# Patient Record
Sex: MALE | Race: WHITE | NOT HISPANIC OR LATINO | Employment: OTHER | URBAN - METROPOLITAN AREA
[De-identification: names, ages, dates, MRNs, and addresses within clinical notes are randomized per-mention and may not be internally consistent; named-entity substitution may affect disease eponyms.]

---

## 2022-06-13 RX ORDER — PIOGLITAZONEHYDROCHLORIDE 30 MG/1
30 TABLET ORAL DAILY
COMMUNITY

## 2022-06-13 RX ORDER — TEMAZEPAM 30 MG/1
30 CAPSULE ORAL NIGHTLY PRN
COMMUNITY
Start: 2022-03-22 | End: 2023-10-23

## 2022-06-13 RX ORDER — FLUOCINONIDE 0.5 MG/G
CREAM TOPICAL
COMMUNITY
Start: 2022-06-03

## 2022-06-13 RX ORDER — ALBUTEROL SULFATE 90 UG/1
2 AEROSOL, METERED RESPIRATORY (INHALATION) EVERY 4 HOURS PRN
COMMUNITY
Start: 2022-05-09

## 2022-06-13 RX ORDER — DULOXETIN HYDROCHLORIDE 30 MG/1
30 CAPSULE, DELAYED RELEASE ORAL DAILY
COMMUNITY
Start: 2022-03-22

## 2022-06-13 RX ORDER — EMTRICITABINE AND TENOFOVIR DISOPROXIL FUMARATE 200; 300 MG/1; MG/1
1 TABLET, FILM COATED ORAL
COMMUNITY
Start: 2022-06-03 | End: 2023-10-23

## 2022-06-13 RX ORDER — RITONAVIR 100 MG/1
100 TABLET ORAL EVERY MORNING
COMMUNITY
Start: 2022-06-03 | End: 2023-01-26

## 2022-06-13 RX ORDER — DAPAGLIFLOZIN 10 MG/1
10 TABLET, FILM COATED ORAL DAILY
COMMUNITY
End: 2023-01-26 | Stop reason: ALTCHOICE

## 2022-06-13 RX ORDER — TAMSULOSIN HYDROCHLORIDE 0.4 MG/1
0.4 CAPSULE ORAL 2 TIMES DAILY
COMMUNITY

## 2022-06-13 RX ORDER — PRAVASTATIN SODIUM 80 MG/1
80 TABLET ORAL DAILY
COMMUNITY
End: 2023-10-23

## 2022-06-13 RX ORDER — ATAZANAVIR 300 MG/1
300 CAPSULE ORAL DAILY
COMMUNITY
Start: 2022-06-03 | End: 2023-10-23

## 2022-06-15 ENCOUNTER — OFFICE VISIT (OUTPATIENT)
Dept: NEPHROLOGY | Facility: CLINIC | Age: 67
End: 2022-06-15
Payer: COMMERCIAL

## 2022-06-15 VITALS
SYSTOLIC BLOOD PRESSURE: 107 MMHG | BODY MASS INDEX: 23.76 KG/M2 | HEART RATE: 92 BPM | HEIGHT: 66 IN | DIASTOLIC BLOOD PRESSURE: 63 MMHG | OXYGEN SATURATION: 97 % | WEIGHT: 147.81 LBS | TEMPERATURE: 98 F

## 2022-06-15 DIAGNOSIS — N18.32 STAGE 3B CHRONIC KIDNEY DISEASE: Primary | ICD-10-CM

## 2022-06-15 PROCEDURE — 99213 OFFICE O/P EST LOW 20 MIN: CPT | Mod: S$GLB,,, | Performed by: INTERNAL MEDICINE

## 2022-06-15 PROCEDURE — 99213 PR OFFICE/OUTPT VISIT, EST, LEVL III, 20-29 MIN: ICD-10-PCS | Mod: S$GLB,,, | Performed by: INTERNAL MEDICINE

## 2022-06-15 RX ORDER — INSULIN LISPRO 100 [IU]/ML
10 INJECTION, SOLUTION INTRAVENOUS; SUBCUTANEOUS
COMMUNITY
Start: 2022-05-23

## 2022-06-15 NOTE — PROGRESS NOTES
"Nephrology Progress Note      Patient Name:  Dandre Hopkins III    :  1955    Medical Record:  [id number]    Interval History:  Seeing for ckd.    Subjective:  Patient seen and examined. No complaints.      Current Medications: Current medications reviewed.    Review of Systems  Review of Systems   Respiratory: Negative for cough, hemoptysis, sputum production and shortness of breath.    Cardiovascular: Negative for chest pain, palpitations, orthopnea, claudication and leg swelling.   Gastrointestinal: Negative for abdominal pain, blood in stool, constipation, diarrhea, heartburn, nausea and vomiting.   Genitourinary: Negative for dysuria, frequency, hematuria and urgency.       Objective:      Physical Exam:   Vital Signs:  /63 (BP Location: Left arm, Patient Position: Sitting, BP Method: Medium (Automatic))   Pulse 92   Temp 98.4 °F (36.9 °C) (Oral)   Ht 5' 6" (1.676 m)   Wt 67 kg (147 lb 12.8 oz)   SpO2 97%   BMI 23.86 kg/m²   Constitutional:  WNL  HENT:  Normocephalic, Atraumatic, Pupils Reactive, Normal oropharynx, Nose normal.   Cardiovascular:  Normal heart rate, Normal rhythm, No murmurs, No rubs, No gallops.   Respiratory:  Normal breath sounds, No respiratory distress, No wheezing, No chest tenderness.   GI:  Bowel sounds normal, Soft, No tenderness, No masses, No pulsatile masses.  : No costovertebral angle tenderness    Extremities:  Intact distal pulses, No edema, No tenderness, No cyanosis, No clubbing.   Neurologic:  Alert & oriented x 3, Normal motor function, Normal sensory function, No focal deficits noted.  Skin:  Warm and dry      Labs:  No results for input(s): K, CO2, BUN, ALBUMIN, CALCIUM, GLU, HGB, WBC, PLT, INR in the last 72 hours.    Invalid input(s): CRE  .    Radiology:   None.      Assessment:  ckd3b    Cr is slightly better.    Plan:  RTC in 6 months.      Electronically signed by: Monisha Ram, 6/15/2022 2:10 PM.    "

## 2023-01-26 ENCOUNTER — OFFICE VISIT (OUTPATIENT)
Dept: NEPHROLOGY | Facility: CLINIC | Age: 68
End: 2023-01-26
Payer: COMMERCIAL

## 2023-01-26 VITALS
DIASTOLIC BLOOD PRESSURE: 62 MMHG | HEART RATE: 93 BPM | OXYGEN SATURATION: 96 % | SYSTOLIC BLOOD PRESSURE: 112 MMHG | HEIGHT: 66 IN | WEIGHT: 147 LBS | BODY MASS INDEX: 23.63 KG/M2

## 2023-01-26 DIAGNOSIS — I10 PRIMARY HYPERTENSION: ICD-10-CM

## 2023-01-26 DIAGNOSIS — N18.32 TYPE 2 DIABETES MELLITUS WITH STAGE 3B CHRONIC KIDNEY DISEASE, WITHOUT LONG-TERM CURRENT USE OF INSULIN: ICD-10-CM

## 2023-01-26 DIAGNOSIS — N18.32 STAGE 3B CHRONIC KIDNEY DISEASE: Primary | ICD-10-CM

## 2023-01-26 DIAGNOSIS — E11.22 TYPE 2 DIABETES MELLITUS WITH STAGE 3B CHRONIC KIDNEY DISEASE, WITHOUT LONG-TERM CURRENT USE OF INSULIN: ICD-10-CM

## 2023-01-26 PROCEDURE — 99213 OFFICE O/P EST LOW 20 MIN: CPT | Mod: S$GLB,,, | Performed by: INTERNAL MEDICINE

## 2023-01-26 PROCEDURE — 99213 PR OFFICE/OUTPT VISIT, EST, LEVL III, 20-29 MIN: ICD-10-PCS | Mod: S$GLB,,, | Performed by: INTERNAL MEDICINE

## 2023-01-26 RX ORDER — EMPAGLIFLOZIN 10 MG/1
10 TABLET, FILM COATED ORAL EVERY MORNING
COMMUNITY
Start: 2022-12-18

## 2023-01-26 RX ORDER — ATORVASTATIN CALCIUM 20 MG/1
20 TABLET, FILM COATED ORAL DAILY
COMMUNITY
Start: 2023-01-02

## 2023-01-26 NOTE — PROGRESS NOTES
Pt Name:  Dandre Hopkins III  Pt :  1955  Pt MRN:  [id number]    Date: 2023  Provider: Monisha Ram    Reason for visit:   Seeing for ckd.    Chief Complaint:   Chief Complaint   Patient presents with    Chronic Kidney Disease     Serum creatinine 1.81, GFR 41, CKD stage 3       HPI:  [unfilled]  No complaints.    History:   Past Medical History:   Diagnosis Date    Bursitis     CKD (chronic kidney disease)     Clotting disorder     Colon polyp     DVT (deep venous thrombosis)     Dyslipidemia     Human immunodeficiency virus (HIV) disease     Insomnia     Neuropathy     Type 2 diabetes mellitus      Past Surgical History:   Procedure Laterality Date    COLONOSCOPY      TONSILLECTOMY      TRACHEOTOMY       Family History   Problem Relation Age of Onset    Cancer Mother         lungs    Emphysema Mother     Diabetes Father     Alzheimer's disease Father     Cancer Sister         lung    Heart disease Brother      Social History     Substance and Sexual Activity   Alcohol Use Never     Social History     Substance and Sexual Activity   Drug Use Never     Social History     Substance and Sexual Activity   Sexual Activity Not on file    Comment: pt declines     has no history on file for sexual activity.  Social History     Tobacco Use   Smoking Status Former    Packs/day: 0.20    Types: Cigarettes    Quit date: 2022    Years since quittin.1   Smokeless Tobacco Never       Allergies:  Review of patient's allergies indicates:  No Known Allergies    [unfilled]    ROS:   Constitutional:  Denies fever or chills   Eyes:  Denies change in visual acuity   HENT:  Denies nasal congestion or sore throat   Respiratory:  As in the history of the present illness.   Cardiovascular:  As in the history of the present illness.   GI:  As in the history of the present illness.    Musculoskeletal:  Denies back pain or joint pain   Integument:  Denies rash   Neurologic:  Denies headache, focal weakness or sensory changes  "  Endocrine:  Denies polyuria or polydipsia   Lymphatic:  Denies swollen glands   Psychiatric:  Denies depression or anxiety    Physical Exam:   Vitals:   Vitals:    01/26/23 1042   BP: 112/62   Pulse: 93   SpO2: 96%   Weight: 66.7 kg (147 lb)   Height: 5' 6" (1.676 m)     Body mass index is 23.73 kg/m².    Constitutional:  Well developed, well nourished, and in no acute distress   Eyes:  PERRLA, conjunctiva normal   HENT:  Atraumatic, external ears normal, nose normal.  Neck: There is no jugular venous distension or thyromegaly.   Respiratory:  No respiratory distress, normal breath sounds, no rales, no wheezing   Cardiovascular:  Normal rate, and a regular rhythm, no murmurs, no gallops, no rub, and no edema.  GI:  Normal bowel sounds.  Musculoskeletal:  No deformities.   Neurologic:  Alert & oriented x 3, CN 2-12 normal, normal motor function, and no asterixis.   Psychiatric:  Speech and behavior appropriate.    Labs/Tests:  Lab Results   Component Value Date     05/26/2022    K 4.3 05/26/2022     05/26/2022    CO2 29 05/26/2022    BUN 20 05/26/2022    CREATININE 1.74 (H) 05/26/2022    CREATININE 2.03 (H) 11/06/2021    CREATININE 2.25 (H) 09/23/2021    GLUCOSE 4+ (A) 11/30/2022    CALCIUM 9.3 05/26/2022    PHOSPHORUS 3.3 05/26/2022    ALBUMIN 4.4 05/26/2022    EGFRNONAA 40 (L) 05/26/2022    EGFRNONAA 33 (L) 11/06/2021    EGFRNONAA 30 (L) 09/23/2021    ESTGFRAFRICA 46 (L) 05/26/2022    ESTGFRAFRICA 39 (L) 11/06/2021    ESTGFRAFRICA 34 (L) 09/23/2021    PTH 53 01/20/2023    HGB 16.2 01/20/2023    HGB 14.6 01/07/2023    HGB 16.1 11/30/2022    HGBA1C 8.5 (H) 09/23/2021    TRIG 219 (H) 06/27/2022    CHOL 205 (H) 06/27/2022    HDL 45 06/27/2022    LDLCALC 116 (H) 06/27/2022    LABVLDL 44 (H) 06/27/2022       Assessment & Plan:    Visit Diagnosis:   [unfilled]  [unfilled]    Problem List: There is no problem list on file for this patient.    Ckd 3b -stable.    Hiv     Niddm          Follow " Up:   Follow up in about 6 months (around 7/26/2023).

## 2023-10-23 ENCOUNTER — OFFICE VISIT (OUTPATIENT)
Dept: NEPHROLOGY | Facility: CLINIC | Age: 68
End: 2023-10-23
Payer: COMMERCIAL

## 2023-10-23 VITALS
HEIGHT: 66 IN | OXYGEN SATURATION: 94 % | SYSTOLIC BLOOD PRESSURE: 115 MMHG | BODY MASS INDEX: 21.83 KG/M2 | HEART RATE: 68 BPM | WEIGHT: 135.81 LBS | DIASTOLIC BLOOD PRESSURE: 60 MMHG

## 2023-10-23 DIAGNOSIS — E11.22 TYPE 2 DIABETES MELLITUS WITH STAGE 3B CHRONIC KIDNEY DISEASE, WITHOUT LONG-TERM CURRENT USE OF INSULIN: ICD-10-CM

## 2023-10-23 DIAGNOSIS — N18.32 STAGE 3B CHRONIC KIDNEY DISEASE: Primary | ICD-10-CM

## 2023-10-23 DIAGNOSIS — N18.32 TYPE 2 DIABETES MELLITUS WITH STAGE 3B CHRONIC KIDNEY DISEASE, WITHOUT LONG-TERM CURRENT USE OF INSULIN: ICD-10-CM

## 2023-10-23 PROCEDURE — 99213 OFFICE O/P EST LOW 20 MIN: CPT | Mod: S$GLB,,, | Performed by: INTERNAL MEDICINE

## 2023-10-23 PROCEDURE — 99213 PR OFFICE/OUTPT VISIT, EST, LEVL III, 20-29 MIN: ICD-10-PCS | Mod: S$GLB,,, | Performed by: INTERNAL MEDICINE

## 2023-10-23 RX ORDER — OMEGA-3-ACID ETHYL ESTERS 1 G/1
2 CAPSULE, LIQUID FILLED ORAL
COMMUNITY
Start: 2023-09-28

## 2023-10-23 RX ORDER — AMOXICILLIN 500 MG/1
500 CAPSULE ORAL
COMMUNITY
Start: 2023-10-05

## 2023-10-23 RX ORDER — GLUCAGON INJECTION, SOLUTION 1 MG/.2ML
INJECTION, SOLUTION SUBCUTANEOUS
COMMUNITY
Start: 2023-09-15

## 2023-10-23 RX ORDER — EMTRICITABINE AND TENOFOVIR ALAFENAMIDE 200; 25 MG/1; MG/1
1 TABLET ORAL DAILY
COMMUNITY
Start: 2023-10-10

## 2023-10-23 NOTE — PROGRESS NOTES
Pt Name:  Dandre Hopkins III  Pt :  1955  Pt MRN:  [id number]    Date: 10/23/2023  Provider: Monisha Ram    Reason for visit: seeing for ckd.      Chief Complaint:   Chief Complaint   Patient presents with    Chronic Kidney Disease     Cre:1.94 gfr:37 ckd stage: 3B       HPI:  HPI   Pt had stroke (bleed)  and brain surgery (drain) since I last saw him. No gi, gu, pulm or card complaints.          History:   Past Medical History:   Diagnosis Date    Brain bleed     Bursitis     CKD (chronic kidney disease)     Clotting disorder     Colon polyp     DVT (deep venous thrombosis)     Dyslipidemia     Human immunodeficiency virus (HIV) disease     Insomnia     Neuropathy     Stroke     Type 2 diabetes mellitus      Past Surgical History:   Procedure Laterality Date    COLONOSCOPY      TONSILLECTOMY      TRACHEOTOMY       Family History   Problem Relation Age of Onset    Cancer Mother         lungs    Emphysema Mother     Diabetes Father     Alzheimer's disease Father     Cancer Sister         lung    Heart disease Brother      Social History     Substance and Sexual Activity   Alcohol Use Never     Social History     Substance and Sexual Activity   Drug Use Never     Social History     Substance and Sexual Activity   Sexual Activity Not on file    Comment: pt declines     has no history on file for sexual activity.  Social History     Tobacco Use   Smoking Status Former    Current packs/day: 0.00    Types: Cigarettes    Quit date: 2022    Years since quittin.8   Smokeless Tobacco Never       Allergies:  Review of patient's allergies indicates:  No Known Allergies    Current Outpatient Medications   Medication Sig Dispense Refill    albuterol (PROVENTIL/VENTOLIN HFA) 90 mcg/actuation inhaler Inhale 2 puffs into the lungs every 4 (four) hours as needed.      amoxicillin (AMOXIL) 500 MG capsule Take 500 mg by mouth as needed.      atazanavir (REYATAZ) 300 MG Cap Take 300 mg by mouth once daily.       "atorvastatin (LIPITOR) 20 MG tablet Take 20 mg by mouth once daily.      DESCOVY 200-25 mg Tab Take 1 tablet by mouth once daily.      DULoxetine (CYMBALTA) 30 MG capsule Take 30 mg by mouth once daily.      fluocinonide 0.05% (LIDEX) 0.05 % cream Apply topically as needed.      GVOKE HYPOPEN 2-PACK 1 mg/0.2 mL AtIn Inject into the skin as needed.      HUMALOG KWIKPEN INSULIN 100 unit/mL pen Inject 10 Units into the skin 3 (three) times daily with meals.      insulin detemir U-100 (LEVEMIR) 100 unit/mL injection Inject 10 Units into the skin every evening.      JARDIANCE 10 mg tablet Take 10 mg by mouth every morning.      omega-3 acid ethyl esters (LOVAZA) 1 gram capsule Take 2 g by mouth as needed.      pioglitazone (ACTOS) 30 MG tablet Take 30 mg by mouth once daily.      semaglutide (OZEMPIC) 1 mg/dose (2 mg/1.5 mL) PnIj Inject 1 mg into the skin every 7 days.      tamsulosin (FLOMAX) 0.4 mg Cap Take 0.4 mg by mouth 2 (two) times a day.      ritonavir (NORVIR) 100 mg Tab tablet Take 100 mg by mouth every morning.       No current facility-administered medications for this visit.        ROS:   Constitutional:  Denies fever or chills   Eyes:  Denies change in visual acuity   HENT:  Denies nasal congestion or sore throat   Respiratory:  As in the history of the present illness.   Cardiovascular:  As in the history of the present illness.   GI:  As in the history of the present illness.    Musculoskeletal:  Denies back pain or joint pain   Integument:  Denies rash   Neurologic:  Denies headache, focal weakness or sensory changes   Endocrine:  Denies polyuria or polydipsia   Lymphatic:  Denies swollen glands   Psychiatric:  Denies depression or anxiety    Physical Exam:   Vitals:   Vitals:    10/23/23 1551   BP: 115/60   Pulse: 68   SpO2: (!) 94%   Weight: 61.6 kg (135 lb 12.8 oz)   Height: 5' 6" (1.676 m)     Body mass index is 21.92 kg/m².    Constitutional:  Well developed, well nourished, and in no acute distress "   Eyes:  PERRLA, conjunctiva normal   HENT:  Atraumatic, external ears normal, nose normal.  Neck: There is no jugular venous distension or thyromegaly.   Respiratory:  No respiratory distress, normal breath sounds, no rales, no wheezing   Cardiovascular:  Normal rate, and a regular rhythm, no murmurs, no gallops, no rub, and no edema.  GI:  Normal bowel sounds.  Musculoskeletal:  No deformities.   Neurologic:  Alert & oriented x 3, CN 2-12 normal, normal motor function, and no asterixis.   Psychiatric:  Speech and behavior appropriate.    Labs/Tests:  Lab Results   Component Value Date     05/26/2022    K 4.3 05/26/2022     05/26/2022    CO2 29 05/26/2022    BUN 20 05/26/2022    CREATININE 1.74 (H) 05/26/2022    CREATININE 2.03 (H) 11/06/2021    CREATININE 2.25 (H) 09/23/2021    GLUCOSE 4+ (A) 08/18/2023    CALCIUM 9.3 05/26/2022    PHOSPHORUS 3.3 05/26/2022    ALBUMIN 4.4 05/26/2022    EGFRNONAA 40 (L) 05/26/2022    EGFRNONAA 33 (L) 11/06/2021    EGFRNONAA 30 (L) 09/23/2021    ESTGFRAFRICA 46 (L) 05/26/2022    ESTGFRAFRICA 39 (L) 11/06/2021    ESTGFRAFRICA 34 (L) 09/23/2021    PTH 71 (H) 10/20/2023    HGBA1C 7.5 (H) 09/11/2023    TRIG 216 (H) 08/18/2023    CHOL 226 (H) 08/18/2023    HDL 52 08/18/2023    LDLCALC 130 (H) 08/18/2023    LABVLDL 43 (H) 08/18/2023       Assessment & Plan:    Visit Diagnosis:   1. Stage 3b chronic kidney disease  Renal Function Panel    Renal Function Panel    PTH, Intact    PTH, Intact      2. Type 2 diabetes mellitus with stage 3b chronic kidney disease, without long-term current use of insulin           Problem List: There is no problem list on file for this patient.    Ckd 3b    Hiv +    Dm2 - on insulin.    1. Stage 3b chronic kidney disease  -     Renal Function Panel; Future; Expected date: 04/23/2024  -     PTH, Intact; Future; Expected date: 04/23/2024    2. Type 2 diabetes mellitus with stage 3b chronic kidney disease, without long-term current use of insulin              Follow Up:   No follow-ups on file.